# Patient Record
Sex: MALE | Race: WHITE | URBAN - METROPOLITAN AREA
[De-identification: names, ages, dates, MRNs, and addresses within clinical notes are randomized per-mention and may not be internally consistent; named-entity substitution may affect disease eponyms.]

---

## 2023-03-02 LAB
ALANINE AMINOTRANSFERASE (SGPT) (U/L) IN SER/PLAS: 32 U/L (ref 10–52)
ALBUMIN (G/DL) IN SER/PLAS: 4.7 G/DL (ref 3.4–5)
ALKALINE PHOSPHATASE (U/L) IN SER/PLAS: 52 U/L (ref 33–120)
ANION GAP IN SER/PLAS: 13 MMOL/L (ref 10–20)
ASPARTATE AMINOTRANSFERASE (SGOT) (U/L) IN SER/PLAS: 19 U/L (ref 9–39)
BASOPHILS (10*3/UL) IN BLOOD BY AUTOMATED COUNT: 0.05 X10E9/L (ref 0–0.1)
BASOPHILS/100 LEUKOCYTES IN BLOOD BY AUTOMATED COUNT: 0.7 % (ref 0–2)
BILIRUBIN TOTAL (MG/DL) IN SER/PLAS: 0.8 MG/DL (ref 0–1.2)
C REACTIVE PROTEIN (MG/L) IN SER/PLAS: <0.1 MG/DL
CALCIUM (MG/DL) IN SER/PLAS: 10 MG/DL (ref 8.6–10.6)
CARBON DIOXIDE, TOTAL (MMOL/L) IN SER/PLAS: 27 MMOL/L (ref 21–32)
CHLORIDE (MMOL/L) IN SER/PLAS: 106 MMOL/L (ref 98–107)
CREATININE (MG/DL) IN SER/PLAS: 0.81 MG/DL (ref 0.5–1.3)
EOSINOPHILS (10*3/UL) IN BLOOD BY AUTOMATED COUNT: 0.18 X10E9/L (ref 0–0.7)
EOSINOPHILS/100 LEUKOCYTES IN BLOOD BY AUTOMATED COUNT: 2.6 % (ref 0–6)
ERYTHROCYTE DISTRIBUTION WIDTH (RATIO) BY AUTOMATED COUNT: 12.2 % (ref 11.5–14.5)
ERYTHROCYTE MEAN CORPUSCULAR HEMOGLOBIN CONCENTRATION (G/DL) BY AUTOMATED: 33.7 G/DL (ref 32–36)
ERYTHROCYTE MEAN CORPUSCULAR VOLUME (FL) BY AUTOMATED COUNT: 88 FL (ref 80–100)
ERYTHROCYTES (10*6/UL) IN BLOOD BY AUTOMATED COUNT: 5.14 X10E12/L (ref 4.5–5.9)
GFR MALE: >90 ML/MIN/1.73M2
GLUCOSE (MG/DL) IN SER/PLAS: 97 MG/DL (ref 74–99)
HEMATOCRIT (%) IN BLOOD BY AUTOMATED COUNT: 45.4 % (ref 41–52)
HEMOGLOBIN (G/DL) IN BLOOD: 15.3 G/DL (ref 13.5–17.5)
IMMATURE GRANULOCYTES/100 LEUKOCYTES IN BLOOD BY AUTOMATED COUNT: 0.6 % (ref 0–0.9)
LEUKOCYTES (10*3/UL) IN BLOOD BY AUTOMATED COUNT: 7 X10E9/L (ref 4.4–11.3)
LYMPHOCYTES (10*3/UL) IN BLOOD BY AUTOMATED COUNT: 2 X10E9/L (ref 1.2–4.8)
LYMPHOCYTES/100 LEUKOCYTES IN BLOOD BY AUTOMATED COUNT: 28.8 % (ref 13–44)
MONOCYTES (10*3/UL) IN BLOOD BY AUTOMATED COUNT: 0.63 X10E9/L (ref 0.1–1)
MONOCYTES/100 LEUKOCYTES IN BLOOD BY AUTOMATED COUNT: 9.1 % (ref 2–10)
NEUTROPHILS (10*3/UL) IN BLOOD BY AUTOMATED COUNT: 4.05 X10E9/L (ref 1.2–7.7)
NEUTROPHILS/100 LEUKOCYTES IN BLOOD BY AUTOMATED COUNT: 58.2 % (ref 40–80)
NRBC (PER 100 WBCS) BY AUTOMATED COUNT: 0 /100 WBC (ref 0–0)
PLATELETS (10*3/UL) IN BLOOD AUTOMATED COUNT: 343 X10E9/L (ref 150–450)
POTASSIUM (MMOL/L) IN SER/PLAS: 4.4 MMOL/L (ref 3.5–5.3)
PROTEIN TOTAL: 7.5 G/DL (ref 6.4–8.2)
SODIUM (MMOL/L) IN SER/PLAS: 142 MMOL/L (ref 136–145)
THYROTROPIN (MIU/L) IN SER/PLAS BY DETECTION LIMIT <= 0.05 MIU/L: 0.63 MIU/L (ref 0.44–3.98)
UREA NITROGEN (MG/DL) IN SER/PLAS: 12 MG/DL (ref 6–23)

## 2023-03-07 LAB — VITAMIN B12 BINDING CAPACITY: 1170 PG/ML (ref 800–2600)

## 2023-12-11 ENCOUNTER — HOSPITAL ENCOUNTER (EMERGENCY)
Facility: HOSPITAL | Age: 33
Discharge: HOME | End: 2023-12-12
Attending: EMERGENCY MEDICINE
Payer: COMMERCIAL

## 2023-12-11 ENCOUNTER — APPOINTMENT (OUTPATIENT)
Dept: RADIOLOGY | Facility: HOSPITAL | Age: 33
End: 2023-12-11
Payer: COMMERCIAL

## 2023-12-11 DIAGNOSIS — F41.9 ANXIETY: Primary | ICD-10-CM

## 2023-12-11 LAB
FLUAV RNA RESP QL NAA+PROBE: NOT DETECTED
FLUBV RNA RESP QL NAA+PROBE: NOT DETECTED
SARS-COV-2 RNA RESP QL NAA+PROBE: NOT DETECTED

## 2023-12-11 PROCEDURE — 93010 ELECTROCARDIOGRAM REPORT: CPT | Performed by: EMERGENCY MEDICINE

## 2023-12-11 PROCEDURE — 71046 X-RAY EXAM CHEST 2 VIEWS: CPT

## 2023-12-11 PROCEDURE — 87636 SARSCOV2 & INF A&B AMP PRB: CPT | Performed by: EMERGENCY MEDICINE

## 2023-12-11 PROCEDURE — 99283 EMERGENCY DEPT VISIT LOW MDM: CPT | Performed by: EMERGENCY MEDICINE

## 2023-12-11 PROCEDURE — 2500000001 HC RX 250 WO HCPCS SELF ADMINISTERED DRUGS (ALT 637 FOR MEDICARE OP): Performed by: EMERGENCY MEDICINE

## 2023-12-11 PROCEDURE — 99284 EMERGENCY DEPT VISIT MOD MDM: CPT | Performed by: EMERGENCY MEDICINE

## 2023-12-11 RX ORDER — LORAZEPAM 0.5 MG/1
1 TABLET ORAL ONCE
Status: COMPLETED | OUTPATIENT
Start: 2023-12-11 | End: 2023-12-11

## 2023-12-11 RX ORDER — IBUPROFEN 600 MG/1
600 TABLET ORAL ONCE
Status: COMPLETED | OUTPATIENT
Start: 2023-12-11 | End: 2023-12-11

## 2023-12-11 RX ADMIN — IBUPROFEN 600 MG: 600 TABLET ORAL at 22:50

## 2023-12-11 RX ADMIN — LORAZEPAM 1 MG: 0.5 TABLET ORAL at 22:50

## 2023-12-11 ASSESSMENT — COLUMBIA-SUICIDE SEVERITY RATING SCALE - C-SSRS
2. HAVE YOU ACTUALLY HAD ANY THOUGHTS OF KILLING YOURSELF?: NO
6. HAVE YOU EVER DONE ANYTHING, STARTED TO DO ANYTHING, OR PREPARED TO DO ANYTHING TO END YOUR LIFE?: NO
1. IN THE PAST MONTH, HAVE YOU WISHED YOU WERE DEAD OR WISHED YOU COULD GO TO SLEEP AND NOT WAKE UP?: NO

## 2023-12-11 ASSESSMENT — PAIN - FUNCTIONAL ASSESSMENT
PAIN_FUNCTIONAL_ASSESSMENT: 0-10
PAIN_FUNCTIONAL_ASSESSMENT: 0-10

## 2023-12-11 ASSESSMENT — PAIN SCALES - GENERAL: PAINLEVEL_OUTOF10: 8

## 2023-12-12 ENCOUNTER — ANCILLARY PROCEDURE (OUTPATIENT)
Dept: EMERGENCY MEDICINE | Facility: HOSPITAL | Age: 33
End: 2023-12-12
Payer: COMMERCIAL

## 2023-12-12 VITALS
TEMPERATURE: 98.3 F | OXYGEN SATURATION: 97 % | DIASTOLIC BLOOD PRESSURE: 87 MMHG | WEIGHT: 130 LBS | BODY MASS INDEX: 19.26 KG/M2 | SYSTOLIC BLOOD PRESSURE: 133 MMHG | HEART RATE: 90 BPM | HEIGHT: 69 IN | RESPIRATION RATE: 18 BRPM

## 2023-12-12 LAB
ATRIAL RATE: 73 BPM
ATRIAL RATE: 89 BPM
P AXIS: 74 DEGREES
P AXIS: 84 DEGREES
P OFFSET: 199 MS
P OFFSET: 209 MS
P ONSET: 153 MS
P ONSET: 159 MS
PR INTERVAL: 128 MS
PR INTERVAL: 136 MS
Q ONSET: 217 MS
Q ONSET: 227 MS
QRS COUNT: 12 BEATS
QRS COUNT: 15 BEATS
QRS DURATION: 92 MS
QRS DURATION: 98 MS
QT INTERVAL: 352 MS
QT INTERVAL: 368 MS
QTC CALCULATION(BAZETT): 405 MS
QTC CALCULATION(BAZETT): 428 MS
QTC FREDERICIA: 393 MS
QTC FREDERICIA: 401 MS
R AXIS: 83 DEGREES
R AXIS: 85 DEGREES
T AXIS: 0 DEGREES
T AXIS: 55 DEGREES
T OFFSET: 401 MS
T OFFSET: 403 MS
VENTRICULAR RATE: 73 BPM
VENTRICULAR RATE: 89 BPM

## 2023-12-12 PROCEDURE — 71046 X-RAY EXAM CHEST 2 VIEWS: CPT | Performed by: RADIOLOGY

## 2023-12-12 PROCEDURE — 93005 ELECTROCARDIOGRAM TRACING: CPT

## 2023-12-12 NOTE — ED PROVIDER NOTES
CC: Palpitations     HPI: Agustin Lee is a 33 y.o. male with history including anxiety and depression presenting to the emergency department for anxiety and palpitations. States he's had stress building up over the past 4 months and this week was heightened due to his final exam tomorrow. He's had increasing anxiety and intermittent palpitations and chest tightness. States it comes and goes and correlates with stress. Also reports dry cough. Denies fever, chills, dyspnea, dizziness, nausea, vomiting, abdominal pain, focal deficits, syncope, leg pain/swelling, SI, HI, hallucinations.     Limitations to History: none  Additional History Obtained from: none     PMHx/PSHx:  Per HPI.   - has no past medical history on file.  - has no past surgical history on file.    Social History:  - Tobacco:  has no history on file for tobacco use.   - Alcohol:  has no history on file for alcohol use.   - Drugs:  has no history on file for drug use.     Medications: Reviewed in EMR.     Allergies:  Patient has no allergy information on record.    ???????????????????????????????????????????????????????????????  Triage Vitals:  T 36.8 °C (98.2 °F)  HR 92  /90  RR 16  O2 98 % None (Room air)    Physical Exam   Gen: awake, well-appearing, no distress  Eyes: no conjunctival injection or scleral icterus, pupils equal, extraocular movements grossly intact  ENT: nares patent, moist mucous membranes  Neck: supple, trachea midline, no masses  Heart: regular rate and rhythm, audible heart sounds  Lungs: clear to auscultation bilaterally, no increased work of breathing  Abdomen: soft, nondistended, nontender, no guarding or rebound  Extremities: well-perfused, no edema  Neuro: alert, conversant, no facial asymmetry, speech fluent, moving all extremities  Psych: very anxious but conversant and able to have a linear conversation, not internally stimulated, denies SI/HI/hallucinations      ???????????????????????????????????????????????????????????????    ED Course/Medical Decision Makin-year-old male with history including anxiety and depression presenting to the emergency department for worsening anxiety and palpitations, particularly this week.  His vitals are stable.  He is well-appearing but very anxious, especially when speaking about his stress.  He notes that this correlates with increased chest tightness and when he is able to calm himself down the pain resolves.  Initial EKG showed subtle ST/T changes which improved on repeat EKG; see ED course.  Given his cough, will obtain chest x-ray, though I suspect his symptoms are secondary to his anxiety and of lower suspicion for cardiac etiology, PE, arrhythmia, or other acute process at this time. Chest x-ray ordered given his cough though I have lower suspicion for bacterial pneumonia. Viral swabs negative. He was given oral Ativan. His presentation is consistent with anxiety/panic attack and I have lower suspicion for arrhythmia, ACS, PE at this time to indicate additional workup. The patient was signed out to the oncoming provider pending chest x-ray and reevaluation. I anticipate discharge if he's still stable. He does not appear to be an imminent threat to others to warrant emergent psychiatric evaluation.     ED Course as of 12/14/23 2043   Mon Dec 11, 2023   2230 EKG 20:45 per my interpretation: Normal sinus rhythm, sinus arrhythmia, rate 89, right axis deviation, normal intervals, no significant ST elevation, slight ST depression with T wave abnormality in 3 and aVF.  No prior EKG to compare. [LM]    Repeat EKG : Normal sinus rhythm, sinus arrhythmia, rate 73, right axis deviation, normal intervals, no significant ST elevation, improved ST depression and T wave abnormalities which are no longer present. [LM]      ED Course User Index  [LM] Janie Ritchie MD         Diagnoses as of 23   Anxiety      External records reviewed: recent inpatient, clinic, and prior ED notes  Diagnostic imaging independently reviewed/interpreted by me (as reflected in MDM) includes: labs imaging   Social Determinants Affecting Care: Mental health issues  Discussion of management with other providers: n/a  Escalation of Care: outpatient mgmt appropriate, pending reeval    Impression:   Anxiety  Palpitations    Disposition: handoff, anticipate discharge      Procedures ? [employer] last updated 12/11/2023 11:40 PM        Janie Ritchie MD  12/14/23 [id number]

## 2023-12-12 NOTE — ED TRIAGE NOTES
Patient presents to the ED for heart palpitations. Patient states he has had a few panic attacks in the past few days and now his heart feels like it is pounding. Is endorsing midsternal, throbbing sensation. Denies SI/HI/AH/VH

## 2024-02-23 ENCOUNTER — OFFICE VISIT (OUTPATIENT)
Dept: PRIMARY CARE | Facility: CLINIC | Age: 34
End: 2024-02-23
Payer: COMMERCIAL

## 2024-02-23 VITALS — SYSTOLIC BLOOD PRESSURE: 111 MMHG | DIASTOLIC BLOOD PRESSURE: 73 MMHG

## 2024-02-23 DIAGNOSIS — F41.8 DEPRESSION WITH ANXIETY: ICD-10-CM

## 2024-02-23 DIAGNOSIS — R26.89 BALANCE DISTURBANCE DUE TO OLD HEAD INJURY: Primary | ICD-10-CM

## 2024-02-23 DIAGNOSIS — K58.8 OTHER IRRITABLE BOWEL SYNDROME: ICD-10-CM

## 2024-02-23 DIAGNOSIS — S09.90XS BALANCE DISTURBANCE DUE TO OLD HEAD INJURY: Primary | ICD-10-CM

## 2024-02-23 PROCEDURE — 99203 OFFICE O/P NEW LOW 30 MIN: CPT | Performed by: INTERNAL MEDICINE
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
## 2024-05-24 ENCOUNTER — TELEPHONE (OUTPATIENT)
Dept: PRIMARY CARE | Facility: CLINIC | Age: 34
End: 2024-05-24

## 2024-05-25 DIAGNOSIS — F41.8 DEPRESSION WITH ANXIETY: Primary | ICD-10-CM

## 2024-12-16 ENCOUNTER — APPOINTMENT (OUTPATIENT)
Dept: PRIMARY CARE | Facility: CLINIC | Age: 34
End: 2024-12-16
Payer: COMMERCIAL

## 2024-12-16 VITALS — BODY MASS INDEX: 20.23 KG/M2 | SYSTOLIC BLOOD PRESSURE: 114 MMHG | DIASTOLIC BLOOD PRESSURE: 74 MMHG | WEIGHT: 137 LBS

## 2024-12-16 DIAGNOSIS — Z00.00 HEALTH CARE MAINTENANCE: Primary | ICD-10-CM

## 2024-12-16 DIAGNOSIS — F41.8 DEPRESSION WITH ANXIETY: ICD-10-CM

## 2024-12-16 DIAGNOSIS — K58.8 OTHER IRRITABLE BOWEL SYNDROME: ICD-10-CM

## 2024-12-16 PROCEDURE — 99213 OFFICE O/P EST LOW 20 MIN: CPT | Performed by: INTERNAL MEDICINE

## 2024-12-16 NOTE — PROGRESS NOTES
Subjective   Patient ID: Brittanie Lee is a 34 y.o. male who presents for No chief complaint on file..    HPI follow-up visit no chest pain no shortness of breath no side effect with medication and otherwise has been doing well but is looking for some accommodations at work because of his productivity because of his anxiety he believes but also has had some nausea bowels okay no dysuria bones muscles joints okay    From prior visit   past medical history anxiety    Medications SSRI    Allergies noted and unchanged    Social history tobacco d/w    Family history there is some depression in the family some irritable bowel    Preventive had been doing some exercises fencing as above but not currently recent blood work reviewed    Review of Systems    Objective   There were no vitals taken for this visit.    Physical Exam vital signs noted alert and oriented x 3 NCAT no coryza nares without discharge OP benign TM normal bilateral EAC clear no AC nodes no JVD no thyromegaly chest clear to auscultation and percussion CV regular rate and rhythm S1-S2 abdomen soft sounds no rg np hsm  extremities no clubbing cyanosis or edema normal distal pulses dtr 2+    Assessment/Plan    impression nausea and would like to have some blood work done okay for check comprehensive panel visit glucose potassium and kidney function as well as liver function good diet regular exercise good water consumption  Imprssion irritable bowel depression with anxiety  Plan nonpharmacological measures for the depression with anxiety and medication as has been in the past check CBC advised on blood count has had TSH checked in the past check TSH and advise on the same check total cholesterol advised on metabolism then recheck and will have his workplace send over any accommodations form that might be needed    traveling to [place]

## 2024-12-16 NOTE — LETTER
December 21, 2024     Patient: Michele Lee   YOB: 1990   Date of Visit: 12/16/2024       To Whom It May Concern:    Michele Lee was seen in my clinic on 12/16/2024. Please excuse Brittanie Velez for his absence from work on this day to make the appointment.    If you have any questions or concerns, please don't hesitate to call.         Sincerely,         Carlos Plata MD        CC: No Recipients